# Patient Record
(demographics unavailable — no encounter records)

---

## 2025-02-14 NOTE — HISTORY OF PRESENT ILLNESS
[FreeTextEntry1] : FBW and medication management  [de-identified] : Mr. COLINDRES is a 30 year- male, with a past medical history as noted below, who present to the office today for fasting labs and a general check up.  Patient states he was taken off Seroquel and put on trazodone.  Since then has lost weight.  Feels much better.  Currently on trazodone 100 mg daily. Requesting renewal of Xanax which she takes on apparent basis for acute anxiety.  Patient states while he was weaning normal Seroquel he took more Xanax to help aid in his anxiety. Patient continues to take rosuvastatin 10 mg daily.  Denies any adverse side effects.  Denies having chest pain, shortness of breath, dyspnea on exertion

## 2025-02-14 NOTE — PLAN
[FreeTextEntry1] : Hyperlipidemia -   Advised low fat cholesterol diet.  Advised importance of having low cholesterol / LDL/ triglycerides. Advised lifestyle modifications.  Continue with current medications. Advised patient to hold fenofibrate.  Continue w/  omega-3 fish oils 1 tab morning 1 tab at night.  Continue with rosuvastatin 5 mg daily.  Repeat lipid panel today. Education was provided regarding statin therapy.  Gastroenterology-History of elevated liver function test-check liver function test.  Nephrology-elevated creatinine level- Check CMP  Dermatology PATRICIA was encouraged to wear sun protective clothing, sun block, and proper use of SPF board brim hats, to seek shade and to avoid the sun in peak hours.  ABCD of skin moles was advised.  Thinning hair - Advised to discuss with his derm to discontinue finasteride.   Psych - Depression / Anxiety - Counseling given -follow-up with psychiatry-continue with trazodone 100 mg daily.  Escitalopram 10 mg.  And alprazolam 0.25 mg daily as needed acute anxiety.  Reviewed .  Discussed adverse side effects to Xanax and abuse potential  Patient is in full awareness of the plan and agrees to it.  All pt question was answered.    I spent 33 Minutes with the patient, half of which we discussed finding on physical exam  and coordinated care.  As well as reviewed my plans and follow ups. Dragon speech recognition software was used to create portions of this document.  An attempt at proofreading has been made to minimize errors please call if any questions arise.

## 2025-02-14 NOTE — PHYSICAL EXAM
[No Acute Distress] : no acute distress [Well Nourished] : well nourished [Well Developed] : well developed [Well-Appearing] : well-appearing [Normal Sclera/Conjunctiva] : normal sclera/conjunctiva [PERRL] : pupils equal round and reactive to light [EOMI] : extraocular movements intact [Normal Outer Ear/Nose] : the outer ears and nose were normal in appearance [Normal Oropharynx] : the oropharynx was normal [Normal TMs] : both tympanic membranes were normal [No JVD] : no jugular venous distention [No Lymphadenopathy] : no lymphadenopathy [Supple] : supple [Thyroid Normal, No Nodules] : the thyroid was normal and there were no nodules present [No Respiratory Distress] : no respiratory distress  [No Accessory Muscle Use] : no accessory muscle use [Clear to Auscultation] : lungs were clear to auscultation bilaterally [Normal Rate] : normal rate  [Regular Rhythm] : with a regular rhythm [Normal S1, S2] : normal S1 and S2 [No Murmur] : no murmur heard [No Carotid Bruits] : no carotid bruits [No Abdominal Bruit] : a ~M bruit was not heard ~T in the abdomen [No Varicosities] : no varicosities [Pedal Pulses Present] : the pedal pulses are present [No Edema] : there was no peripheral edema [No Palpable Aorta] : no palpable aorta [No Extremity Clubbing/Cyanosis] : no extremity clubbing/cyanosis [Soft] : abdomen soft [Non Tender] : non-tender [Non-distended] : non-distended [No Masses] : no abdominal mass palpated [No HSM] : no HSM [Normal Bowel Sounds] : normal bowel sounds [Normal Posterior Cervical Nodes] : no posterior cervical lymphadenopathy [Normal Anterior Cervical Nodes] : no anterior cervical lymphadenopathy [No CVA Tenderness] : no CVA  tenderness [No Spinal Tenderness] : no spinal tenderness [No Joint Swelling] : no joint swelling [Grossly Normal Strength/Tone] : grossly normal strength/tone [No Rash] : no rash [Coordination Grossly Intact] : coordination grossly intact [No Focal Deficits] : no focal deficits [Normal Gait] : normal gait [Speech Grossly Normal] : speech grossly normal [Normal Affect] : the affect was normal [Alert and Oriented x3] : oriented to person, place, and time [Normal Mood] : the mood was normal [Normal Insight/Judgement] : insight and judgment were intact [Stool Occult Blood] : stool negative for occult blood

## 2025-02-14 NOTE — DATA REVIEWED
[FreeTextEntry1] : Reviewed prior blood work  reviewed medication regimen  Reviewed [facility] [id number] reviewed weight trend

## 2025-02-14 NOTE — REVIEW OF SYSTEMS
[Diarrhea] : diarrhea [Anxiety] : anxiety [Negative] : Heme/Lymph [Recent Change In Weight] : ~T recent weight change [Fever] : no fever [Chills] : no chills [Fatigue] : no fatigue [Night Sweats] : no night sweats [Discharge] : no discharge [Pain] : no pain [Vision Problems] : no vision problems [Earache] : no earache [Nosebleeds] : no nosebleeds [Nasal Discharge] : no nasal discharge [Sore Throat] : no sore throat [Hoarseness] : no hoarseness [Chest Pain] : no chest pain [Palpitations] : no palpitations [Claudication] : no  leg claudication [Lower Ext Edema] : no lower extremity edema [Shortness Of Breath] : no shortness of breath [Wheezing] : no wheezing [Cough] : no cough [Dyspnea on Exertion] : not dyspnea on exertion [Abdominal Pain] : no abdominal pain [Nausea] : no nausea [Constipation] : no constipation [Vomiting] : no vomiting [Heartburn] : no heartburn [Melena] : no melena [Dysuria] : no dysuria [Hematuria] : no hematuria [Frequency] : no frequency [Joint Pain] : no joint pain [Joint Stiffness] : no joint stiffness [Joint Swelling] : no joint swelling [Itching] : no itching [Nail Changes] : no nail changes [Skin Rash] : no skin rash [Headache] : no headache [Dizziness] : no dizziness [Fainting] : no fainting [Depression] : no depression [Easy Bleeding] : no easy bleeding [Easy Bruising] : no easy bruising [Swollen Glands] : no swollen glands [FreeTextEntry2] : weight loss  [FreeTextEntry9] : Intermittent knee pain [de-identified] : thin hair line

## 2025-02-14 NOTE — ASSESSMENT
[FreeTextEntry1] : Mr. OCLINDRES is a 30 year male, with a past medical history as noted above, who present to the office today for fasting labs, weight check and medication renewal for anxiety

## 2025-02-14 NOTE — HEALTH RISK ASSESSMENT
[Yes] : Yes [2 - 4 times a month (2 pts)] : 2-4 times a month (2 points) [3 or 4 (1 pt)] : 3 or 4  (1 point) [Less than monthly (1 pt)] : Less than monthly (1 point) [No] : In the past 12 months have you used drugs other than those required for medical reasons? No [No falls in past year] : Patient reported no falls in the past year [Little interest or pleasure doing things] : 1) Little interest or pleasure doing things [0] : 1) Little interest or pleasure doing things: Not at all (0) [Feeling down, depressed, or hopeless] : 2) Feeling down, depressed, or hopeless [1] : 2) Feeling down, depressed, or hopeless for several days (1) [PHQ-2 Negative - No further assessment needed] : PHQ-2 Negative - No further assessment needed [Never] : Never [Audit-CScore] : 4 [de-identified] :  neurologist; Dr. Rossi Powell [de-identified] : Walking   - 5 x  aweke  [de-identified] : Leonel [[id number]] : 1

## 2025-02-14 NOTE — COUNSELING
[Behavioral health counseling provided] : Behavioral health counseling provided [AUDIT-C Screening administered and reviewed] : AUDIT-C Screening administered and reviewed [Encouraged to increase physical activity] : Encouraged to increase physical activity [Decrease Portions] : decrease portions [Good understanding] : Patient has a good understanding of disease, goals and obesity follow-up plan [FreeTextEntry2] : Low-fat low-cholesterol diet Increase fluid intake.

## 2025-04-08 NOTE — ASSESSMENT
[FreeTextEntry1] : Mr. COLINDRES is a 31 year-old male, with a past medical history as noted above, who present to the office today for physical exam and having chronic LBP

## 2025-04-08 NOTE — PHYSICAL EXAM
[Stool Occult Blood] : stool negative for occult blood [de-identified] : negative SLR  [de-identified] : flat feet

## 2025-04-08 NOTE — PHYSICAL EXAM
[Stool Occult Blood] : stool negative for occult blood [de-identified] : negative SLR  [de-identified] : flat feet

## 2025-04-08 NOTE — HISTORY OF PRESENT ILLNESS
[FreeTextEntry1] : Physical exam  [de-identified] : Mr. COLINDRES is a 31 year-old-male, with a past medical history as noted below, who present to the office today for physical exam. Been having LBP discomfort for long period of time - recently back pain is worse. no radiation of the pain has noticed left foot falling  asleep.

## 2025-04-08 NOTE — HEALTH RISK ASSESSMENT
[FreeTextEntry1] : back pain  [de-identified] :  neurologist; Dr. Rossi Powell [Audit-CScore] : 4 [de-identified] : Walking   - 5 x  aweke  [de-identified] : Leonel [[id number]] : 1

## 2025-04-08 NOTE — HISTORY OF PRESENT ILLNESS
[FreeTextEntry1] : Physical exam  [de-identified] : Mr. COLINDRES is a 31 year-old-male, with a past medical history as noted below, who present to the office today for physical exam. Been having LBP discomfort for long period of time - recently back pain is worse. no radiation of the pain has noticed left foot falling  asleep.

## 2025-04-08 NOTE — HEALTH RISK ASSESSMENT
[FreeTextEntry1] : back pain  [de-identified] :  neurologist; Dr. Rossi Powell [Audit-CScore] : 4 [de-identified] : Walking   - 5 x  aweke  [de-identified] : Leonel [[id number]] : 1

## 2025-04-08 NOTE — PLAN
[FreeTextEntry1] : Cardiopulmonary  -Screening for coronary artery disease -   We reviewed and discussed the EKG.   Patient was advised the importance of participating in aerobic exercise programs improve their stamina.  PATRICIA  was encouraged to start an exercise program.    Screening for hyperlipidemia - Check FLP  Ortho chronic LBP - Advised to start PT Advised to follow up with a podiatrist for evaluation of flat feet and getting orthotics. discussed flat feet can cause back pain and knee pain  check x-ray of l-s spine   Dermatology PATRICIA was encouraged to wear sun protective clothing, sun block, and proper use of SPF board brim hats, to seek shade and to avoid the sun in peak hours.  ABCD of skin moles was advised.   Psych - anxiety and depression counseling given - Check labs continue with current medication regimen   Adult BMI screening and followup:  face to face education and discussion lasting 15 minutes. The patient's BMI is about 26. Counseled patient regarding BMI, healthy eating, portion control and exercise importance of diet to maintain a healthy weight. Counseled patient on importance of exercise to maintain a healthy weight   Dragon speech recognition software was used to create portions of this document.  An attempt at proofreading has been made to minimize errors please call if any questions arise.   check labs